# Patient Record
Sex: FEMALE | Race: WHITE | NOT HISPANIC OR LATINO | Employment: PART TIME | URBAN - METROPOLITAN AREA
[De-identification: names, ages, dates, MRNs, and addresses within clinical notes are randomized per-mention and may not be internally consistent; named-entity substitution may affect disease eponyms.]

---

## 2018-05-21 ENCOUNTER — HISTORICAL (OUTPATIENT)
Dept: ENDOSCOPY | Facility: HOSPITAL | Age: 63
End: 2018-05-21

## 2020-11-13 ENCOUNTER — HISTORICAL (OUTPATIENT)
Dept: ADMINISTRATIVE | Facility: HOSPITAL | Age: 65
End: 2020-11-13

## 2020-11-13 LAB
FLUAV AG UPPER RESP QL IA.RAPID: NEGATIVE
FLUBV AG UPPER RESP QL IA.RAPID: NEGATIVE

## 2021-07-15 ENCOUNTER — HISTORICAL (OUTPATIENT)
Dept: ADMINISTRATIVE | Facility: HOSPITAL | Age: 66
End: 2021-07-15

## 2024-02-28 ENCOUNTER — OFFICE VISIT (OUTPATIENT)
Dept: ORTHOPEDICS | Facility: CLINIC | Age: 69
End: 2024-02-28
Payer: MEDICARE

## 2024-02-28 ENCOUNTER — HOSPITAL ENCOUNTER (OUTPATIENT)
Dept: RADIOLOGY | Facility: CLINIC | Age: 69
Discharge: HOME | End: 2024-02-28
Attending: PHYSICIAN ASSISTANT
Payer: MEDICARE

## 2024-02-28 VITALS
WEIGHT: 200.38 LBS | HEIGHT: 60 IN | HEART RATE: 79 BPM | DIASTOLIC BLOOD PRESSURE: 62 MMHG | SYSTOLIC BLOOD PRESSURE: 130 MMHG | BODY MASS INDEX: 39.34 KG/M2

## 2024-02-28 DIAGNOSIS — M25.511 BILATERAL SHOULDER PAIN, UNSPECIFIED CHRONICITY: Primary | ICD-10-CM

## 2024-02-28 DIAGNOSIS — M25.511 BILATERAL SHOULDER PAIN, UNSPECIFIED CHRONICITY: ICD-10-CM

## 2024-02-28 DIAGNOSIS — M25.512 BILATERAL SHOULDER PAIN, UNSPECIFIED CHRONICITY: Primary | ICD-10-CM

## 2024-02-28 DIAGNOSIS — M75.111 NONTRAUMATIC INCOMPLETE TEAR OF RIGHT ROTATOR CUFF: ICD-10-CM

## 2024-02-28 DIAGNOSIS — M25.512 BILATERAL SHOULDER PAIN, UNSPECIFIED CHRONICITY: ICD-10-CM

## 2024-02-28 DIAGNOSIS — M75.112 NONTRAUMATIC INCOMPLETE TEAR OF LEFT ROTATOR CUFF: ICD-10-CM

## 2024-02-28 PROCEDURE — 99203 OFFICE O/P NEW LOW 30 MIN: CPT | Mod: ,,, | Performed by: PHYSICIAN ASSISTANT

## 2024-02-28 PROCEDURE — 73030 X-RAY EXAM OF SHOULDER: CPT | Mod: 50,,, | Performed by: PHYSICIAN ASSISTANT

## 2024-02-28 RX ORDER — AMLODIPINE BESYLATE 2.5 MG/1
2.5 TABLET ORAL
COMMUNITY

## 2024-02-28 RX ORDER — MELOXICAM 7.5 MG/1
7.5 TABLET ORAL DAILY
Qty: 30 TABLET | Refills: 1 | Status: SHIPPED | OUTPATIENT
Start: 2024-02-28

## 2024-02-28 RX ORDER — LOSARTAN POTASSIUM 100 MG/1
100 TABLET ORAL
COMMUNITY

## 2024-02-28 RX ORDER — METFORMIN HYDROCHLORIDE 1000 MG/1
1000 TABLET ORAL 2 TIMES DAILY
COMMUNITY

## 2024-02-28 RX ORDER — SOD SULF/POT CHLORIDE/MAG SULF 1.479 G
1 TABLET ORAL
COMMUNITY
Start: 2023-09-15

## 2024-02-28 RX ORDER — DULAGLUTIDE 4.5 MG/.5ML
INJECTION, SOLUTION SUBCUTANEOUS
COMMUNITY

## 2024-02-28 RX ORDER — CITALOPRAM 40 MG/1
40 TABLET, FILM COATED ORAL
COMMUNITY

## 2024-02-28 RX ORDER — DAPAGLIFLOZIN 10 MG/1
10 TABLET, FILM COATED ORAL
COMMUNITY

## 2024-02-28 RX ORDER — METRONIDAZOLE 7.5 MG/G
CREAM TOPICAL 2 TIMES DAILY
COMMUNITY
Start: 2023-11-05

## 2024-02-28 RX ORDER — GLIPIZIDE 10 MG/1
10 TABLET ORAL 2 TIMES DAILY
COMMUNITY

## 2024-02-28 RX ORDER — ROSUVASTATIN CALCIUM 20 MG/1
20 TABLET, COATED ORAL
COMMUNITY

## 2024-02-28 RX ORDER — DOXYCYCLINE 100 MG/1
100 CAPSULE ORAL
COMMUNITY

## 2024-02-29 NOTE — PROGRESS NOTES
Chief Complaint:   Chief Complaint   Patient presents with    Shoulder Pain     ROSA shoulder pain, back in October she had a fall and has been going to PT with Theresa thinks its doing better, nothing shoots down into her hands, feels like the right one is worse off       History of present illness:    This is a 68 y.o. year old female who complains of bilateral shoulder pain.    Patient states of the right shoulder is the more problematic of the 2 and she did have a fall back in October but she has been here recently in the shoulder pain has significantly decreased   She is here today for orthopedic evaluation      Current Outpatient Medications   Medication Sig    amLODIPine (NORVASC) 2.5 MG tablet Take 2.5 mg by mouth.    citalopram (CELEXA) 40 MG tablet Take 40 mg by mouth.    dapagliflozin propanediol (FARXIGA) 10 mg tablet Take 10 mg by mouth.    doxycycline (MONODOX) 100 MG capsule Take 100 mg by mouth.    dulaglutide (TRULICITY) 4.5 mg/0.5 mL pen injector Inject into the skin.    glipiZIDE (GLUCOTROL) 10 MG tablet Take 10 mg by mouth 2 (two) times daily.    losartan (COZAAR) 100 MG tablet Take 100 mg by mouth.    metFORMIN (GLUCOPHAGE) 1000 MG tablet Take 1,000 mg by mouth 2 (two) times daily.    metronidazole 0.75% (METROCREAM) 0.75 % Crea Apply topically 2 (two) times daily.    rosuvastatin (CRESTOR) 20 MG tablet Take 20 mg by mouth twice a week.    SUTAB 1.479-0.188- 0.225 gram tablet Take 1 tablet by mouth.    meloxicam (MOBIC) 7.5 MG tablet Take 1 tablet (7.5 mg total) by mouth once daily. Take with food     No current facility-administered medications for this visit.       Review of Systems:    Constitution:   Denies chills, fever, and sweats.  HENT:   Denies headaches or blurry vision.  Cardiovascular:  Denies chest pain or irregular heart beat.  Respiratory:   Denies cough or shortness of breath.  Gastrointestinal:  Denies abdominal pain, nausea, or vomiting.  Musculoskeletal:   Denies muscle  cramps.  Neurological:   Denies dizziness or focal weakness.  Psychiatric/Behavior: Normal mental status.  Hematology/Lymph:  Denies bleeding problem or easy bruising/bleeding.  Skin:    Denies rash or suspicious lesions.    Examination:    Vital Signs:    Vitals:    02/28/24 1531   BP: 130/62   Pulse: 79   Weight: 90.9 kg (200 lb 6.4 oz)   Height: 5' (1.524 m)       Body mass index is 39.14 kg/m².    Constitution:   Well-developed, well nourished patient in no acute distress.  Neurological:   Alert and oriented x 3 and cooperative to examination.     Psychiatric/Behavior: Normal mental status.  Respiratory:   No shortness of breath.  Eyes:    Extraoccular muscles intact  Skin:    No scars, rash or suspicious lesions.    Physical Exam:   Bilateral Shoulder:     No swelling, erythema or increased heat    Tender over deltoid, supraspinatus and infraspinatus    Tender over bicipital groove    Negative drop arm test     Positive Neer and Marino impingement signs    Mild Pain and weakness with rotator cuff resistance   Active shoulder abduction 90  Active forward elevation 170  Active internal rotation 40   Active external rotation 50     Imaging: X-rays ordered and images interpreted today personally by me of bilateral shoulders four views each shoulder.    Patient has no acute osseous abnormalities noted.    Well-preserved glenohumeral joint spaces        Assessment: Bilateral shoulder pain, unspecified chronicity  -     X-Ray Shoulder 2 or more views Bilat; Future; Expected date: 02/28/2024    Nontraumatic incomplete tear of right rotator cuff    Nontraumatic incomplete tear of left rotator cuff    Other orders  -     meloxicam (MOBIC) 7.5 MG tablet; Take 1 tablet (7.5 mg total) by mouth once daily. Take with food  Dispense: 30 tablet; Refill: 1         Plan:  At this time the patient states her pain is not significant enough to have an injection   We will try a prescription anti-inflammatory and continue with the  physical therapy and home exercise program pain   She will follow up in a month if her pain persists or increases we will consider injection at that time          DISCLAIMER: This note may have been dictated using voice recognition software and may contain grammatical errors.     NOTE: Consult report sent to referring provider via [facility].

## 2024-04-23 ENCOUNTER — TELEPHONE (OUTPATIENT)
Dept: ORTHOPEDICS | Facility: CLINIC | Age: 69
End: 2024-04-23
Payer: MEDICARE

## 2024-04-25 ENCOUNTER — TELEPHONE (OUTPATIENT)
Dept: ORTHOPEDICS | Facility: CLINIC | Age: 69
End: 2024-04-25
Payer: MEDICARE

## 2024-04-25 NOTE — TELEPHONE ENCOUNTER
[facility] pharmacy has sent a refill request for mobic     I called and spoke with patient to verify this information    Patient denied the request for mobic. States at this time she does not want to take it.     Patient will call back with further questions or concerns.

## 2025-02-13 ENCOUNTER — HOSPITAL ENCOUNTER (OUTPATIENT)
Dept: RADIOLOGY | Facility: CLINIC | Age: 70
Discharge: HOME | End: 2025-02-13
Attending: PHYSICIAN ASSISTANT
Payer: MEDICARE

## 2025-02-13 ENCOUNTER — OFFICE VISIT (OUTPATIENT)
Dept: ORTHOPEDICS | Facility: CLINIC | Age: 70
End: 2025-02-13
Payer: MEDICARE

## 2025-02-13 VITALS — BODY MASS INDEX: 39.34 KG/M2 | WEIGHT: 200.38 LBS | HEIGHT: 60 IN

## 2025-02-13 DIAGNOSIS — M25.552 LEFT HIP PAIN: ICD-10-CM

## 2025-02-13 DIAGNOSIS — M25.552 LEFT HIP PAIN: Primary | ICD-10-CM

## 2025-02-13 DIAGNOSIS — M70.62 TROCHANTERIC BURSITIS OF LEFT HIP: ICD-10-CM

## 2025-02-13 DIAGNOSIS — M25.562 LEFT KNEE PAIN, UNSPECIFIED CHRONICITY: ICD-10-CM

## 2025-02-13 DIAGNOSIS — M16.12 PRIMARY OSTEOARTHRITIS OF LEFT HIP: ICD-10-CM

## 2025-02-13 PROCEDURE — 99213 OFFICE O/P EST LOW 20 MIN: CPT | Mod: ,,, | Performed by: PHYSICIAN ASSISTANT

## 2025-02-13 PROCEDURE — 73502 X-RAY EXAM HIP UNI 2-3 VIEWS: CPT | Mod: LT,,, | Performed by: PHYSICIAN ASSISTANT

## 2025-02-13 PROCEDURE — 73564 X-RAY EXAM KNEE 4 OR MORE: CPT | Mod: LT,,, | Performed by: PHYSICIAN ASSISTANT

## 2025-02-13 RX ORDER — MELOXICAM 15 MG/1
15 TABLET ORAL DAILY
Qty: 30 TABLET | Refills: 2 | Status: SHIPPED | OUTPATIENT
Start: 2025-02-13

## 2025-02-13 RX ORDER — TIRZEPATIDE 12.5 MG/.5ML
12.5 INJECTION, SOLUTION SUBCUTANEOUS
COMMUNITY

## 2025-02-13 NOTE — PROGRESS NOTES
Chief Complaint:   Chief Complaint   Patient presents with    Pain     LT knee/hip pain, wbat, ambulates without assistance, states pain started around the last week of January, states pain with sleeping on sides, took mobic which helped some relief, reports increased pain, unable to go up stairs without pain, states feels as if hip and knee will give out on her,        History of present illness:    This is a 69 y.o. year old female who complains of left hip and thigh pain  History of Present Illness    CHIEF COMPLAINT:  - Left hip pain    HPI:  Kenna presents with left hip pain that has been ongoing and worsening with activity. Pain is located in the left buttock, wrapping around to the front and sometimes extending down to the knee. Kenna experiences numbness and tingling in the left leg. Pain occurs even when sleeping, affecting both sides. Kenna reports that increased walking exacerbates the pain.    The pain has significantly impacted mobility. Kenna describes difficulty climbing stairs, requiring support from handrails. Occasional lower back pain is also reported.    Kenna had been walking 1-2 miles twice daily on a treadmill, building up to over 9,000 steps in a day. However, the increased activity exacerbated the pain. A 15mg meloxicam tablet (an old prescription from 2016) was taken to alleviate the pain.    Dr. Pierre had been treating the patient for plantar fasciitis in the left foot and suggested a left leg length discrepancy, providing an insert. Kenna expresses uncertainty about the relevance of this diagnosis to the current condition.    Recently, the patient noticed swelling in the left ankle, suspected to be edema. Kenna emphasizes a typically high pain tolerance.    Kenna mentions a previous knee injury involving a ligament (possibly MCL), but clarifies it was not the meniscus. A history of shoulder issues is also reported, possibly related to posture while watching TV.    Kenna denies  any severe knee arthritis based on the practitioner's assessment of x-rays. Dr. Pierre had been treating the patient's plantar fasciitis on the left foot.  Kenna was given orthotic inserts by Dr. Pierre for a supposed leg length discrepancy.  Kenna has been walking 1-2 miles twice daily on a treadmill at [facility] for exercise, which seemed to exacerbate the hip pain.  Kenna has been using stretching exercises.    IMAGING:  - X-rays of the pelvis: Good space between the ball and socket of the hip joint, with some wear and loss of cartilage at the bottom, indicating mild arthritis  - X-rays of the knees: Mild arthritis, but overall good spacing between the bones    WORK STATUS:  - Previously worked as a teacher         Current Outpatient Medications   Medication Sig    amLODIPine (NORVASC) 2.5 MG tablet Take 2.5 mg by mouth.    citalopram (CELEXA) 40 MG tablet Take 40 mg by mouth.    dapagliflozin propanediol (FARXIGA) 10 mg tablet Take 10 mg by mouth.    doxycycline (MONODOX) 100 MG capsule Take 100 mg by mouth.    losartan (COZAAR) 100 MG tablet Take 100 mg by mouth.    metFORMIN (GLUCOPHAGE) 1000 MG tablet Take 1,000 mg by mouth 2 (two) times daily.    metronidazole 0.75% (METROCREAM) 0.75 % Crea Apply topically 2 (two) times daily.    MOUNJARO 12.5 mg/0.5 mL PnIj Inject 12.5 mg into the skin every 7 days.    rosuvastatin (CRESTOR) 20 MG tablet Take 20 mg by mouth twice a week.    dulaglutide (TRULICITY) 4.5 mg/0.5 mL pen injector Inject into the skin.    glipiZIDE (GLUCOTROL) 10 MG tablet Take 10 mg by mouth 2 (two) times daily.    meloxicam (MOBIC) 15 MG tablet Take 1 tablet (15 mg total) by mouth once daily. Take with food    meloxicam (MOBIC) 7.5 MG tablet Take 1 tablet (7.5 mg total) by mouth once daily. Take with food    SUTAB 1.479-0.188- 0.225 gram tablet Take 1 tablet by mouth.     No current facility-administered medications for this visit.       Review of Systems:    Constitution:   Denies chills, fever,  and sweats.  HENT:   Denies headaches or blurry vision.  Cardiovascular:  Denies chest pain or irregular heart beat.  Respiratory:   Denies cough or shortness of breath.  Gastrointestinal:  Denies abdominal pain, nausea, or vomiting.  Musculoskeletal:   Denies muscle cramps.  Neurological:   Denies dizziness or focal weakness.  Psychiatric/Behavior: Normal mental status.  Hematology/Lymph:  Denies bleeding problem or easy bruising/bleeding.  Skin:    Denies rash or suspicious lesions.    Examination:    Vital Signs:    Vitals:    02/13/25 1033   Weight: 90.9 kg (200 lb 6.4 oz)   Height: 5' (1.524 m)       Body mass index is 39.14 kg/m².    Constitution:   Well-developed, well nourished patient in no acute distress.  Neurological:   Alert and oriented x 3 and cooperative to examination.     Psychiatric/Behavior: Normal mental status.  Respiratory:   No shortness of breath.  Eyes:    Extraoccular muscles intact  Skin:    No scars, rash or suspicious lesions.    Physical Exam:   Hip Exam:  Left  No obvious deformity.   Flexion to 120 degrees and internal and external rotation to 40 degrees.   Abduction to 45 degree and adduction to 30 degrees.   The patient has mild pain with end range internal rotation with the hip flexed  Negative LC test.   Negative Stinchfield test.   No flexion contracture.   Positive  greater trochanteric tenderness.   Negative JUNAID test.   5/5 strength, normal skin appearance and palpable pulses distally. Sensibility normal.      Left Knee   No effusion    No erythema, warmth bruising     active flexion 125   active extension 0    Mild tenderness over medial joint line  Nontender over MCL   No lateral compartment tenderness   Negative  Braxton test   Negative  lachman test   No instability on varus or valgus stress   No weakness of the  knee was observed.        Imaging: X-rays ordered and images interpreted today personally by me of left hip three views.    Patient does have some mild  arthritic change of the inferior aspect of her femoral acetabular joint.    There is no acute osseous abnormalities noted.      The patient has left knee x-rays four views taken today.    Patient has well-maintained joint spaces with no significant narrowing        Assessment: Left hip pain  -     X-Ray Hip 2 or 3 views Left with Pelvis when performed; Future; Expected date: 02/13/2025    Left knee pain, unspecified chronicity  -     X-Ray Knee Complete 4 or More Views Left; Future; Expected date: 02/13/2025    Trochanteric bursitis of left hip    Primary osteoarthritis of left hip    Other orders  -     meloxicam (MOBIC) 15 MG tablet; Take 1 tablet (15 mg total) by mouth once daily. Take with food  Dispense: 30 tablet; Refill: 2         Plan:  This point the patient does have trochanteric bursitis along with some early arthritis of her left hip.    She does not wish for an injection for her trochanteric bursa today so we will put her on some anti-inflammatories in his home stretching program   She will follow up in a month if her pain persists we will consider injection at that time    If she has increasing problems with numbness and tingling we will have to work her back up at that point          DISCLAIMER: This note may have been dictated using voice recognition software and may contain grammatical errors.     NOTE: Consult report sent to referring provider via [facility] [employer].